# Patient Record
Sex: FEMALE | Race: WHITE
[De-identification: names, ages, dates, MRNs, and addresses within clinical notes are randomized per-mention and may not be internally consistent; named-entity substitution may affect disease eponyms.]

---

## 2019-08-20 ENCOUNTER — HOSPITAL ENCOUNTER (OUTPATIENT)
Dept: HOSPITAL 96 - M.ULTRA | Age: 59
End: 2019-08-20
Attending: FAMILY MEDICINE
Payer: COMMERCIAL

## 2019-08-20 DIAGNOSIS — Z88.8: ICD-10-CM

## 2019-08-20 DIAGNOSIS — E04.1: Primary | ICD-10-CM

## 2019-08-27 ENCOUNTER — HOSPITAL ENCOUNTER (OUTPATIENT)
Dept: HOSPITAL 96 - M.MRI | Age: 59
End: 2019-08-27
Attending: FAMILY MEDICINE
Payer: COMMERCIAL

## 2019-08-27 DIAGNOSIS — G89.29: ICD-10-CM

## 2019-08-27 DIAGNOSIS — M67.461: Primary | ICD-10-CM

## 2020-01-01 ENCOUNTER — HOSPITAL ENCOUNTER (INPATIENT)
Dept: HOSPITAL 96 - M.ERS | Age: 60
LOS: 3 days | Discharge: HOME | DRG: 189 | End: 2020-01-04
Attending: INTERNAL MEDICINE | Admitting: INTERNAL MEDICINE
Payer: COMMERCIAL

## 2020-01-01 VITALS — DIASTOLIC BLOOD PRESSURE: 89 MMHG | SYSTOLIC BLOOD PRESSURE: 143 MMHG

## 2020-01-01 VITALS — WEIGHT: 175.1 LBS | BODY MASS INDEX: 31.81 KG/M2 | HEIGHT: 62.01 IN

## 2020-01-01 VITALS — DIASTOLIC BLOOD PRESSURE: 81 MMHG | SYSTOLIC BLOOD PRESSURE: 139 MMHG

## 2020-01-01 VITALS — SYSTOLIC BLOOD PRESSURE: 174 MMHG | DIASTOLIC BLOOD PRESSURE: 108 MMHG

## 2020-01-01 DIAGNOSIS — Z79.899: ICD-10-CM

## 2020-01-01 DIAGNOSIS — Z88.5: ICD-10-CM

## 2020-01-01 DIAGNOSIS — Z90.710: ICD-10-CM

## 2020-01-01 DIAGNOSIS — I24.9: ICD-10-CM

## 2020-01-01 DIAGNOSIS — E03.9: ICD-10-CM

## 2020-01-01 DIAGNOSIS — Z88.1: ICD-10-CM

## 2020-01-01 DIAGNOSIS — J96.01: Primary | ICD-10-CM

## 2020-01-01 DIAGNOSIS — J44.1: ICD-10-CM

## 2020-01-01 DIAGNOSIS — Z90.49: ICD-10-CM

## 2020-01-01 DIAGNOSIS — F17.210: ICD-10-CM

## 2020-01-01 LAB
ABSOLUTE BASOPHILS: 0.1 THOU/UL (ref 0–0.2)
ABSOLUTE EOSINOPHILS: 0.4 THOU/UL (ref 0–0.7)
ABSOLUTE MONOCYTES: 0.5 THOU/UL (ref 0–1.2)
ALBUMIN SERPL-MCNC: 3.3 G/DL (ref 3.4–5)
ALP SERPL-CCNC: 128 U/L (ref 46–116)
ALT SERPL-CCNC: 61 U/L (ref 30–65)
ANION GAP SERPL CALC-SCNC: 8 MMOL/L (ref 7–16)
AST SERPL-CCNC: 38 U/L (ref 15–37)
BASOPHILS NFR BLD AUTO: 1.4 %
BILIRUB SERPL-MCNC: 0.5 MG/DL
BUN SERPL-MCNC: 9 MG/DL (ref 7–18)
CALCIUM SERPL-MCNC: 8 MG/DL (ref 8.5–10.1)
CHLORIDE SERPL-SCNC: 103 MMOL/L (ref 98–107)
CO2 SERPL-SCNC: 28 MMOL/L (ref 21–32)
CREAT SERPL-MCNC: 0.9 MG/DL (ref 0.6–1.3)
EOSINOPHIL NFR BLD: 4.3 %
GLUCOSE SERPL-MCNC: 125 MG/DL (ref 70–99)
GRANULOCYTES NFR BLD MANUAL: 68.4 %
HCT VFR BLD CALC: 43.2 % (ref 37–47)
HGB BLD-MCNC: 14.4 GM/DL (ref 12–15)
LIPASE: 102 U/L (ref 73–393)
LYMPHOCYTES # BLD: 2.2 THOU/UL (ref 0.8–5.3)
LYMPHOCYTES NFR BLD AUTO: 21.2 %
MAGNESIUM SERPL-MCNC: 1.9 MG/DL (ref 1.8–2.4)
MCH RBC QN AUTO: 29.2 PG (ref 26–34)
MCHC RBC AUTO-ENTMCNC: 33.5 G/DL (ref 28–37)
MCV RBC: 87.3 FL (ref 80–100)
MONOCYTES NFR BLD: 4.7 %
MPV: 8 FL. (ref 7.2–11.1)
NEUTROPHILS # BLD: 7 THOU/UL (ref 1.6–8.1)
NT-PRO BRAIN NAT PEPTIDE: 19 PG/ML (ref ?–300)
NUCLEATED RBCS: 0 /100WBC
PLATELET COUNT*: 273 THOU/UL (ref 150–400)
POTASSIUM SERPL-SCNC: 3.8 MMOL/L (ref 3.5–5.1)
PROT SERPL-MCNC: 6.5 G/DL (ref 6.4–8.2)
RBC # BLD AUTO: 4.95 MIL/UL (ref 4.2–5)
RDW-CV: 14.4 % (ref 10.5–14.5)
SODIUM SERPL-SCNC: 139 MMOL/L (ref 136–145)
WBC # BLD AUTO: 10.2 THOU/UL (ref 4–11)

## 2020-01-01 NOTE — NUR
SPOKE WITH DR BONNER AND HE ELECTRONICLY S[facility]. PT
UNDERSTANDS TO TAKE 1.5 TABS OF HER HOME MEDICATION BID PER DR. BONNER.

## 2020-01-02 VITALS — DIASTOLIC BLOOD PRESSURE: 82 MMHG | SYSTOLIC BLOOD PRESSURE: 153 MMHG

## 2020-01-02 VITALS — DIASTOLIC BLOOD PRESSURE: 78 MMHG | SYSTOLIC BLOOD PRESSURE: 132 MMHG

## 2020-01-02 VITALS — DIASTOLIC BLOOD PRESSURE: 80 MMHG | SYSTOLIC BLOOD PRESSURE: 129 MMHG

## 2020-01-02 VITALS — SYSTOLIC BLOOD PRESSURE: 154 MMHG | DIASTOLIC BLOOD PRESSURE: 74 MMHG

## 2020-01-02 VITALS — SYSTOLIC BLOOD PRESSURE: 134 MMHG | DIASTOLIC BLOOD PRESSURE: 79 MMHG

## 2020-01-02 VITALS — SYSTOLIC BLOOD PRESSURE: 147 MMHG | DIASTOLIC BLOOD PRESSURE: 81 MMHG

## 2020-01-02 NOTE — NUR
RECEIVED REPORT FROM ED RN. PT TRANSFERRED TO  212. PT A&OX4. VSS. CARDIAC
MONITOR IN PLACE. ADMISSION HISTORY & PHYSICAL ASSESSMENT COMPLETED AND
CHARTED. PT ONRA. PT TRACING SR ON TELE. PT UPADLIB TO RESTROOM. DENIES ANY
PAIN OR DISCOMFORT. INSTRUCTED ON NPO POST MIDNIGHT FOR CARDIO CONSULT.
COMMUNICATES UNDERSTANDING. PT ABLE TO SLEEP WELL ON BED. CALL LIGHT WITHIN
REACH.

## 2020-01-02 NOTE — NUR
ASSUMED PT CARE AT 0700, A&O X4, UP AD CHIO, CARDIAC MONITOR TRACING SINUS
RHYTHM/SINUS TACHY, FULL ASSESSMENT AS CHARTED. PT COMPLETED NUC MED STRESS
TEST THIS SHIFT, RESULTS AS CHARTED. HOURLY ROUNDING COMPLETED.

## 2020-01-02 NOTE — CARDNUC
[place] [address]
Phone:  [phone]                     CARDIAC NUCLEAR IMAGING REPORT
_______________________________________________________________________________
 
Name:         ANTOINE UPTON        Room:          [address] [place] 
[facility]#:    [id number]     Account #:     [id number]  
Admission:    01/01/20    Attend Phys:   Stephanie Auguste
Discharge:                Date of Birth: [birth year]  
Date of Service: 01/02/20 1734  Report #:      [id number]
        [id number] 
_______________________________________________________________________________
THIS REPORT FOR:  //name//                      
 
 
--------------- APPROVED REPORT --------------
 
 
Imaging Protocol: Rest Tc-99m/Stress Tc-99m 1 day
Study performed:  01/01/2020 15:58:00
 
Indication: Chest pain, Dyspnea
Patient Location: In-Patient
Room #: 212
Stress Tech: Windy Schaefer
Stress Nurse: Veronica Dalton RN
NM Tech:SHEKHAR Leyva
 
Ht: 5 ft 2 in  Wt: 174 lbs  BSA:  1.80 m2
    BMI:  31.82
 
Medical History
Medical History: COPD
Medications: asa-325, ntg
Allergies: codiene, hydrocodone, clavulinic acid amoxil
Cardiac Risk Factors: age, Current Smoker
Exercise History: Sedentary
 
Resting Data
Rest SPECT myocardial perfusion imaging was performed in supine 
position 30 minutes following the intravenous injection of 9.8 mCi of 
Tc-99m Sestamibi.
Time of rest injection: 1210     Date: 01/02/2020
The images were gated to evaluate regional wall motion and calculate 
left ventricular ejection fraction. 
Administration Route: IV
 
Pharmacologic Stress
Pharmacologic stress test was performed by injecting Regadenoson 0.4 
mg IV push over 10-15 seconds immediately followed by the intravenous 
injection of 32.1 mCi of Tc-99m Sestamibi.
Time of stress injection: 1330     Date: 01/02/2020
Administration Route: IV
Gated Stress SPECT was performed 40 minutes after stress 
injection.
The images were gated to evaluate regional wall motion and calculate 
left ventricular ejection fraction. 
Prone imaging was performed.
 
 
[place] [address]
Phone:  [phone]                     CARDIAC NUCLEAR IMAGING REPORT
_______________________________________________________________________________
 
Name:         ANTOINE UPTON        Room:          [address] [place] 
[facility]#:    [id number]     Account #:     [id number]  
Admission:    01/01/20    Attend Phys:   Stephanie Auguste
Discharge:                Date of Birth: [birth year]  
Date of Service: 01/02/20 1734  Report #:      [id number]
        [id number] 
_______________________________________________________________________________
 
Stress Test Details
Stress Test:  Pharmacologic stress testing performed using 0.4 mg of 
regadenoson per 5 mL given IV over 10 seconds.      
  Reason for pharmacologic stress test: physical limitation.      
  60 mg caffeine given for dyspnea.        
HR      Max Heart Rate (APMHR): 161 bpm  
Resting HR:            104 bpm   Target HR (85% APMHR): 136 bpm  
Max HR Achieved:  118 bpm        
% of APMHR:         73         
Recovery HR:            110 bpm        
 
BP           
Resting BP:  126/82 mmHg        
Max BP:       127/87 mmHg        
Recovery BP:       145/90 mmHg        
ECG           
Resting ECG:  Sinus Rhythm        
Stress ECG:     Sinus Tachycardia       
ST Change: None          
Arrhythmia:    None         
Recovery ECG: sinus tachycardia        
Recovery ST Change: None        
Recovery Arrhythmia: None        
 
Clinical
Reason for Termination: Completed protocol
Exercise duration: 0 min  sec
Exercise capacity: 1 METs
The patient tolerated Lexiscan infusion without significant cardiac 
symptoms.
 
Nurse Comments
pt pretreated foor nausea with zofran 4 mg ivp.  pt too soa to wallk 
on treadmill
 
Stress ECG Conclusion
The baseline EKG show sinus rhythm with no significant ST segment or 
T-wave abnormalities. EKGs obtained during and post Lexiscan infusion 
show sinus rhythm and sinus tachycardia with no significant ST 
segment changes when compared baseline. There were no stress-induced 
arrhythmias.
 
Study Quality
Study: Good
Artifact: No artifact 
 
 
[place] [address]
Phone:  [phone]                     CARDIAC NUCLEAR IMAGING REPORT
_______________________________________________________________________________
 
Name:         ANTOINE UPTON        Room:          [address]#:    [id number]     Account #:     [id number]  
Admission:    01/01/20    Attend Phys:   Stephanie Auguste
Discharge:                Date of Birth: [birth year]  
Date of Service: 01/02/20 1734  Report #:      [id number]
        [id number] 
_______________________________________________________________________________
 
Study Data
At rest, the left ventricular ejection fraction was 90%..   
Post stress, the left ventricular ejection was 
90%..   
 
Perfusion
Perfusion images obtained at rest and post Lexiscan stress show 
uniform uptake of the radioisotope throughout the myocardium without 
defect.
 
Wall Motion
Global wall motion is vigorous without focal wall motion 
abnormality.
 
Nuclear Conclusion
ECG Findings: negative for ischemia 
Clinical Findings: negative for ischemia 
Nuclear Findings: negative for ischemia 
Exercise Capacity: not assessed
Left Ventricular Function: normal 
Risk Study: low
Myocardial perfusion images show no defect to suggest infarct or 
ischemia. Left ventricular systolic function is normal on gated 
studies. This is a low risk study. 
 
<Conclusion>
The baseline EKG show sinus rhythm with no significant ST segment or 
T-wave abnormalities. EKGs obtained during and post Lexiscan infusion 
show sinus rhythm and sinus tachycardia with no significant ST 
segment changes when compared baseline. There were no stress-induced 
arrhythmias.
 
 
 
 
 
 
 
 
 
 
 
 
  <ELECTRONICALLY SIGNED>
                                           By: Willis Melton MD, FACC   
  01/02/20     1734
D: 01/02/20 1734   _____________________________________
T: 01/02/20 1734   Willis Melton MD, FACC     /INF

## 2020-01-02 NOTE — EKG
[place] [address]
Phone:  [phone]                     ELECTROCARDIOGRAM REPORT      
_______________________________________________________________________________
 
Name:       ANTOINE UPTON         Room:           [address]    [place]  
.R.#:  [id number]      Account #:      [id number]  
Admission:  20     Attend Phys:    SAMEERA Hinojosa
Discharge:               Date of Birth:  [birth year]  
         Report #: [id number]
    [id number]
_______________________________________________________________________________
THIS REPORT FOR:  //name//                      
 
                         [facility] ED
                                       
Test Date:    2020               Test Time:    07:25:44
Pat Name:     ANTIONE REYES        Department:   
Patient ID:   [id number]            Room:         [facility]
Gender:       F                        Technician:   TS
:          1960               Requested By: Alhaji Carvalho
Order Number: [id number] MD:   Anoop Castanon
                                 Measurements
Intervals                              Axis          
Rate:         92                       P:            69
KS:           149                      QRS:          69
QRSD:         84                       T:            69
QT:           381                                    
QTc:          472                                    
                           Interpretive Statements
Sinus rhythm
Low voltage, extremity and precordial leads
Baseline wander in lead(s) V4
Compared to ECG 2019 03:22:22
Sinus tachycardia no longer present
 
Electronically Signed On 2020 12:29:34 CST by Anoop Castanon
[email or web address]
 
 
 
 
 
 
 
 
 
 
 
 
 
 
 
 
 
  <ELECTRONICALLY SIGNED>
                                           By: Anoop Castanon MD, [facility]     
  20     1229
D: 2025   _____________________________________
T: 2025   Anoop Castanon MD, [facility]       /EPI

## 2020-01-03 VITALS — DIASTOLIC BLOOD PRESSURE: 80 MMHG | SYSTOLIC BLOOD PRESSURE: 140 MMHG

## 2020-01-03 VITALS — DIASTOLIC BLOOD PRESSURE: 81 MMHG | SYSTOLIC BLOOD PRESSURE: 122 MMHG

## 2020-01-03 VITALS — SYSTOLIC BLOOD PRESSURE: 135 MMHG | DIASTOLIC BLOOD PRESSURE: 71 MMHG

## 2020-01-03 VITALS — SYSTOLIC BLOOD PRESSURE: 145 MMHG | DIASTOLIC BLOOD PRESSURE: 89 MMHG

## 2020-01-03 VITALS — DIASTOLIC BLOOD PRESSURE: 83 MMHG | SYSTOLIC BLOOD PRESSURE: 139 MMHG

## 2020-01-03 VITALS — DIASTOLIC BLOOD PRESSURE: 84 MMHG | SYSTOLIC BLOOD PRESSURE: 145 MMHG

## 2020-01-03 NOTE — NUR
ASSUMED PATIENT CARE AT 1900. ASSESSMENT COMPLETED AS CHARTED. PATIENT IS
SR/ST ON THE MONITOR. HOURLY ROUNDING IN PLACE FOR PATIENT SAFETY. CLWR.

## 2020-01-04 VITALS — DIASTOLIC BLOOD PRESSURE: 73 MMHG | SYSTOLIC BLOOD PRESSURE: 113 MMHG

## 2020-01-04 VITALS — DIASTOLIC BLOOD PRESSURE: 77 MMHG | SYSTOLIC BLOOD PRESSURE: 155 MMHG

## 2020-01-04 VITALS — DIASTOLIC BLOOD PRESSURE: 76 MMHG | SYSTOLIC BLOOD PRESSURE: 124 MMHG

## 2020-01-04 VITALS — DIASTOLIC BLOOD PRESSURE: 65 MMHG | SYSTOLIC BLOOD PRESSURE: 135 MMHG

## 2020-01-04 NOTE — NUR
ASSUMED PT CARE REPORT RECEIVED FROM NURSE PT IS AOX4 ON RA SINUS TACHY ON THE
CARDIAC MONITOR. NO COMPLAINT. PO MEDICINE GIVEN. IV ABX HANGED AS ORDERED.
DISCAHRGE ORDERED. DC INSTRUCTION GIVEN. IV LINE REMOVED. HEART MONITOR
RETRIEVED. PT LEFT FLOOR ACCOMPANIED BY THIS NURSE AND PT .

## 2020-11-10 ENCOUNTER — HOSPITAL ENCOUNTER (OUTPATIENT)
Dept: HOSPITAL 96 - M.ULTRA | Age: 60
End: 2020-11-10
Attending: FAMILY MEDICINE
Payer: COMMERCIAL

## 2020-11-10 DIAGNOSIS — E04.1: Primary | ICD-10-CM

## 2020-12-02 ENCOUNTER — HOSPITAL ENCOUNTER (OUTPATIENT)
Dept: HOSPITAL 96 - M.ULTRA | Age: 60
End: 2020-12-02
Attending: FAMILY MEDICINE
Payer: COMMERCIAL

## 2020-12-02 DIAGNOSIS — R20.0: Primary | ICD-10-CM

## 2020-12-02 DIAGNOSIS — F17.210: ICD-10-CM

## 2020-12-02 DIAGNOSIS — R20.9: ICD-10-CM

## 2020-12-09 ENCOUNTER — HOSPITAL ENCOUNTER (OUTPATIENT)
Dept: HOSPITAL 96 - M.ULTRA | Age: 60
End: 2020-12-09
Attending: FAMILY MEDICINE
Payer: COMMERCIAL

## 2020-12-09 DIAGNOSIS — R20.9: ICD-10-CM

## 2020-12-09 DIAGNOSIS — R20.0: Primary | ICD-10-CM

## 2020-12-09 DIAGNOSIS — F17.210: ICD-10-CM

## 2021-10-22 ENCOUNTER — HOSPITAL ENCOUNTER (EMERGENCY)
Dept: HOSPITAL 96 - M.ERS | Age: 61
Discharge: HOME | End: 2021-10-22
Payer: COMMERCIAL

## 2021-10-22 VITALS — DIASTOLIC BLOOD PRESSURE: 72 MMHG | SYSTOLIC BLOOD PRESSURE: 128 MMHG

## 2021-10-22 VITALS — BODY MASS INDEX: 29.64 KG/M2 | WEIGHT: 157.01 LBS | HEIGHT: 61 IN

## 2021-10-22 DIAGNOSIS — Z90.710: ICD-10-CM

## 2021-10-22 DIAGNOSIS — Z90.49: ICD-10-CM

## 2021-10-22 DIAGNOSIS — M25.551: Primary | ICD-10-CM

## 2021-10-22 DIAGNOSIS — Z88.0: ICD-10-CM

## 2021-10-22 DIAGNOSIS — Z79.899: ICD-10-CM

## 2021-10-22 DIAGNOSIS — Z88.5: ICD-10-CM

## 2021-10-22 DIAGNOSIS — Z88.1: ICD-10-CM

## 2021-10-22 DIAGNOSIS — J44.9: ICD-10-CM

## 2021-10-22 DIAGNOSIS — J45.909: ICD-10-CM
